# Patient Record
Sex: FEMALE | Race: WHITE | Employment: OTHER | URBAN - METROPOLITAN AREA
[De-identification: names, ages, dates, MRNs, and addresses within clinical notes are randomized per-mention and may not be internally consistent; named-entity substitution may affect disease eponyms.]

---

## 2018-08-01 ENCOUNTER — HOSPITAL ENCOUNTER (OUTPATIENT)
Dept: NEUROLOGY | Age: 73
Discharge: HOME | End: 2018-08-01
Payer: MEDICARE

## 2018-08-01 PROCEDURE — 95886 MUSC TEST DONE W/N TEST COMP: CPT

## 2018-08-01 PROCEDURE — 95886 MUSC TEST DONE W/N TEST COMP: CPT | Performed by: PSYCHIATRY & NEUROLOGY

## 2018-08-01 PROCEDURE — 95913 NRV CNDJ TEST 13/> STUDIES: CPT

## 2018-08-01 PROCEDURE — 95913 NRV CNDJ TEST 13/> STUDIES: CPT | Performed by: PSYCHIATRY & NEUROLOGY

## 2018-08-01 NOTE — PROCEDURES
denervation changes as well. Electrodiagnostic examination of both arms disclosed evidence diagnostic of diffuse right sided cervical motor radiculopathies---involving at least the C5 through C8 motor roots. These were proven with the abnormal needle testing of the paraspinals. The absent right dorsal ulnar cutaneous sensory response likely reflects local damage to that nerve. There was no evidence of motor conduction velocity slowing of the right ulnar nerve about the elbow segments. There were no other motor radiculopathies. There were no other peripheral neuropathies. Sensory radiculopathies can not be evaluated by electrodiagnostic means. Clinically, the patient presented with increasing weakness of right forearm and hand. She noted difficulties performing fine finger movements in that hand. She noted neck pains radiating into her shoulder and arm intermittently for years. On brief neurological examination, I find minimal atrophy of the right hand intrinsics, decreased strength in the hand intrinsics and sensory loss over the fourth and fifth digits of the hand. Her difficulties are the result of her severe cervical radiculopathies-----especially, the acute denervation changes primarily in the right C8 motor root. I recommend neurosurgical evaluation. Clinical correlation was highly advised.       Normal nerve Conduction Values    Sensory Nerves Peak to Peak  Amplitude  (mV) Peak Latency (ms)   Superficial Radial Sensory Antidromic (10cm) 11 2.8    Median Sensory Antidromic Dig II   Palm (7cm)  Wrist (14 cm)  Age 19-49 BMI <24  Age 47-78 BMI <24  Age 20-48 BMI >/= 24  Age 47-78 BMI >/= 24   8  13  19  15  13  8   2.3  4     Ulnar Sensory Antidromic Dig V (14 cm)  Age 20-48 BMI <24  Age 47-78 BMI <24  Age 20-48 BMI >/= 24  Age 47-78 BMI >/= 24 9  13  13  8  4 4   Medial Antebrachial cutaneous Sensory Antidromic (10 cm)   3 2.6   Lateral Antebrachial cutaneous Sensory Antidromic (10 cm) 6 1.3  2.6  2.6  1.1  1.1        32%  25%   38  43  37  39  36  42  6m/s  12%     6.5  6.5  6.5  6.5  6.5   Tibial motor AH  All ages  Age 23 to 34 <160 cm tall  Age 30-49 <160 cm tall  Age 48 to 61 <160 cm tall  Age 61 to 78 <160 cm tall  Age 23 to 34, 160-170 cm tall  Age 30-49 160-170 cm tall  Age 48 to 61 160-170 cm tall  Age 61 to 78 160-170 cm tall  Age 23 to 34 >/=170 cm tall  Age 30-49 >/=170 cm tall  Age 48 to 61 >/=170 cm tall  Age 61 to 78 >/=170 cm tall  Amplitude drop from ankle to knee  % amplitude drop ankle to knee   4.4  5.8  5.3  5.3  1.1  5.8  5.3  5.3  1.1  5.8  5.3  5.3  1.1  10.3  71%   39  44  44  40  40  42  42  34  34  37  37  34  34   6.1  6.1  6.1  6.1  6.1  6.1  6.1  6.1  6.1  6.1  6.1  6.1  6.1     Ulnar motor (FDI)  Age <9  Age 7-34  Age 35-46  Age 52-63  Age >57   >8  >8  >7  >7  >7   >51  >51  >50  >50  >50   <3.8  <3.8  <4.3  <4.5  <4.5     Radial motor (EDC)  Age <9  Age 7-34  Age 35-46  Age 52-63  Age >57   >6  >6  >6  >5  >5   >47  >47  >50  >50  >50   <3.0  <3.0  <3.1  <3.1  <3.1   Musculocutaneous motor (Biceps)   Age <9  Age 7-34  Age 35-46  Age 52-63  Age >57   >4  >4  >4  >4  >3    <3.5  <3.5  <3.5  <3.5  <3.8   Axillary motor (Deltoid)  Age <9  Age 7-34  Age 35-46  Age 52-63  Age >57   >4  >4  >4  >4  >3    <4.8  <4.8  <4.8  <4.8  <5     Tibial motor (ADQP)  Age <9  Age 7-34  Age 35-46  Age 52-63  Age >57   >4  >4  >4  >3  >3   >41  >41  >41  >40  >40   <6.0  <6.0  <6.5  <6.5  <6.5   Peroneal motor (TA)  Age <9  Age 7-32>4  Age 35-46  Age 52-63  Age >57   >4  >4  >4  >3  >3   >41  >41  >40  >40  >40   <4  <4  <4  <4.5  <4.5     Femoral motor (RF)  Age <9  Age 7-34  Age 35-46  Age 52-63  Age >57   >4  >4  >4  >3  >3      <6.0  <6.0  <6.5  <6.5  <6.5         Nerve F  Minimal latency (ms)   Median (APB) 32   Ulnar (ADM)  32   Peroneal motor (EDB) <56   Tibial motor (AH) <56     Nerve  H-reflex latency (ms) H reflex- side to side latency  difference (ms)   Tibial